# Patient Record
Sex: FEMALE | Race: WHITE | Employment: FULL TIME | URBAN - METROPOLITAN AREA
[De-identification: names, ages, dates, MRNs, and addresses within clinical notes are randomized per-mention and may not be internally consistent; named-entity substitution may affect disease eponyms.]

---

## 2019-11-28 ENCOUNTER — HOSPITAL ENCOUNTER (EMERGENCY)
Age: 49
Discharge: HOME | End: 2019-11-28
Payer: COMMERCIAL

## 2019-11-28 VITALS
DIASTOLIC BLOOD PRESSURE: 77 MMHG | RESPIRATION RATE: 14 BRPM | OXYGEN SATURATION: 97 % | BODY MASS INDEX: 38.62 KG/M2 | HEIGHT: 62 IN | SYSTOLIC BLOOD PRESSURE: 161 MMHG | HEART RATE: 77 BPM | WEIGHT: 209.88 LBS | TEMPERATURE: 98 F

## 2019-11-28 DIAGNOSIS — S61.219A LACERATION OF FINGER OF RIGHT HAND, INITIAL ENCOUNTER: Primary | ICD-10-CM

## 2019-11-28 PROCEDURE — 99282 EMERGENCY DEPT VISIT SF MDM: CPT

## 2019-11-28 PROCEDURE — 90715 TDAP VACCINE 7 YRS/> IM: CPT | Performed by: PHYSICIAN ASSISTANT

## 2019-11-28 PROCEDURE — 4500000022 HC ED LEVEL 2 PROCEDURE

## 2019-11-28 PROCEDURE — 90471 IMMUNIZATION ADMIN: CPT | Performed by: PHYSICIAN ASSISTANT

## 2019-11-28 PROCEDURE — 6360000002 HC RX W HCPCS: Performed by: PHYSICIAN ASSISTANT

## 2019-11-28 PROCEDURE — 2500000003 HC RX 250 WO HCPCS: Performed by: PHYSICIAN ASSISTANT

## 2019-11-28 RX ORDER — LIDOCAINE HYDROCHLORIDE AND EPINEPHRINE 10; 10 MG/ML; UG/ML
20 INJECTION, SOLUTION INFILTRATION; PERINEURAL ONCE
Status: COMPLETED | OUTPATIENT
Start: 2019-11-28 | End: 2019-11-28

## 2019-11-28 RX ADMIN — TETANUS TOXOID, REDUCED DIPHTHERIA TOXOID AND ACELLULAR PERTUSSIS VACCINE, ADSORBED 0.5 ML: 5; 2.5; 8; 8; 2.5 SUSPENSION INTRAMUSCULAR at 21:46

## 2019-11-28 RX ADMIN — LIDOCAINE HYDROCHLORIDE,EPINEPHRINE BITARTRATE 20 ML: 10; .01 INJECTION, SOLUTION INFILTRATION; PERINEURAL at 21:46

## 2019-11-28 SDOH — HEALTH STABILITY: MENTAL HEALTH: HOW OFTEN DO YOU HAVE A DRINK CONTAINING ALCOHOL?: NEVER

## 2019-11-28 ASSESSMENT — PAIN DESCRIPTION - ORIENTATION: ORIENTATION: RIGHT

## 2019-11-28 ASSESSMENT — PAIN SCALES - GENERAL
PAINLEVEL_OUTOF10: 3
PAINLEVEL_OUTOF10: 3

## 2019-11-28 ASSESSMENT — PAIN DESCRIPTION - LOCATION: LOCATION: FINGER (COMMENT WHICH ONE)

## 2019-11-28 ASSESSMENT — PAIN DESCRIPTION - PAIN TYPE: TYPE: ACUTE PAIN

## 2019-11-28 ASSESSMENT — PAIN DESCRIPTION - DESCRIPTORS: DESCRIPTORS: ACHING

## 2019-11-28 ASSESSMENT — PAIN DESCRIPTION - FREQUENCY: FREQUENCY: CONTINUOUS

## 2022-08-23 ENCOUNTER — OFFICE VISIT (OUTPATIENT)
Dept: ENT CLINIC | Age: 52
End: 2022-08-23
Payer: COMMERCIAL

## 2022-08-23 VITALS
HEART RATE: 79 BPM | OXYGEN SATURATION: 97 % | DIASTOLIC BLOOD PRESSURE: 82 MMHG | SYSTOLIC BLOOD PRESSURE: 134 MMHG | TEMPERATURE: 98.6 F

## 2022-08-23 DIAGNOSIS — J33.9 NASAL POLYPOSIS: Primary | Chronic | ICD-10-CM

## 2022-08-23 DIAGNOSIS — J30.89 NON-SEASONAL ALLERGIC RHINITIS, UNSPECIFIED TRIGGER: Chronic | ICD-10-CM

## 2022-08-23 DIAGNOSIS — J32.9 CHRONIC SINUSITIS, UNSPECIFIED LOCATION: Chronic | ICD-10-CM

## 2022-08-23 PROCEDURE — 99204 OFFICE O/P NEW MOD 45 MIN: CPT | Performed by: OTOLARYNGOLOGY

## 2022-08-23 RX ORDER — ACETAMINOPHEN 160 MG
TABLET,DISINTEGRATING ORAL
COMMUNITY
Start: 2022-08-17

## 2022-08-23 RX ORDER — AMLODIPINE BESYLATE 5 MG/1
5 TABLET ORAL DAILY
COMMUNITY

## 2022-08-23 RX ORDER — FLUOXETINE HYDROCHLORIDE 20 MG/1
CAPSULE ORAL
COMMUNITY
Start: 2022-07-21

## 2022-08-23 RX ORDER — FLUTICASONE PROPIONATE 50 MCG
SPRAY, SUSPENSION (ML) NASAL
Qty: 16 G | Refills: 2 | Status: SHIPPED | OUTPATIENT
Start: 2022-08-23

## 2022-08-23 RX ORDER — ACETAMINOPHEN AND CODEINE PHOSPHATE 120; 12 MG/5ML; MG/5ML
1 SOLUTION ORAL DAILY
COMMUNITY

## 2022-08-23 ASSESSMENT — ENCOUNTER SYMPTOMS
RHINORRHEA: 0
SORE THROAT: 0
SINUS PAIN: 0

## 2022-08-23 NOTE — PROGRESS NOTES
[facility]       NEW PATIENT VISIT      PCP:  Nikky Torres MD      REFERRED BY:   Nikky Torres MD       CHIEF COMPLAINT  Chief Complaint   Patient presents with    Nasal Polyps       HISTORY OF PRESENT ILLNESS       Miriam Phillip is a 45 y.o. female who presented today for evaluation and management for Nose problem or 10-15 years. Nasal polyps removed by ENT 30 years ago. No further problems until 12 years ago. Difficulty breathing through nose right side. Put up with it until recently. Not sleeping well. Allergy issues. Sinus infections two episodes in past three years Dr. Nitish Ayala. Uses Xyzal.        REVIEW OF SYSTEMS   Review of Systems   Constitutional:  Negative for chills and fever. HENT:  Negative for ear discharge, ear pain, rhinorrhea, sinus pain and sore throat. PAST MEDICAL HISTORY    Past Medical History:   Diagnosis Date    Diabetes mellitus ([facility]        History reviewed. No pertinent surgical history. EXAMINATION    Vitals:    08/23/22 1626   BP: 134/82   Site: Right Upper Arm   Position: Sitting   Cuff Size: Large Adult   Pulse: 79   Temp: 98.6 °F (37 °C)   TempSrc: Temporal   SpO2: 97%     General:  WDWN, NAD, alert and oriented  Face: There was no swelling or lesions detected. Voice: Normal with no hoarseness or hot potato voice. Ears: The external ears, mastoids, TMs and EACs appeared to be normal.    (+) Nose:  polyps filling right nasal cavity and NSD to the left . Otherwise, the external nose, nasal septum, turbinates, secretions, and mucosa appeared to be normal.   Sinuses:  Maxillary and frontal sinuses were nontender to palpation and percussion.     Oral cavity:  Mucosa, secretions, tongue, and gingiva appeared to be normal.   Oropharynx:  The palatine tonsils, hard and soft palates, uvula, tongue, posterior oropharyngeal wall, mucosa and secretions appeared to be normal. Salivary Glands:  Normal bilateral parotid and bilateral submandibular salivary glands. Neck:  No masses or tenderness. Trachea midline. Laryngeal cartilages and hyoid bone normal.  Normal laryngeal crepitus. Thyroid:  Normal, nontender, no goiter or nodules palpable. Lymph nodes:  No cervical lymphadenopathy. IMPRESSION / Sung Joel / Wiliam Belen was seen today for nasal polyps. Diagnoses and all orders for this visit:    Nasal polyposis  -     CT SINUS FOR IMAGE GUIDANCE  -     fluticasone (FLONASE) 50 MCG/ACT nasal spray; 2 sprays in each nostril daily. Chronic sinusitis, unspecified location  -     CT SINUS FOR IMAGE GUIDANCE  -     fluticasone (FLONASE) 50 MCG/ACT nasal spray; 2 sprays in each nostril daily. Non-seasonal allergic rhinitis, unspecified trigger         RECOMMENDATIONS/PLAN      Sinus CT. Nasal polypectomy, possible FESS. Return in about 3 weeks (around 9/13/2022) for recheck/follow-up, and sooner if condition worsens.

## 2022-09-01 ENCOUNTER — HOSPITAL ENCOUNTER (OUTPATIENT)
Dept: CT IMAGING | Age: 52
Discharge: HOME | End: 2022-09-01
Payer: COMMERCIAL

## 2022-09-01 DIAGNOSIS — J32.9 CHRONIC SINUSITIS, UNSPECIFIED LOCATION: ICD-10-CM

## 2022-09-01 DIAGNOSIS — J33.9 NASAL POLYPOSIS: ICD-10-CM

## 2022-09-01 PROCEDURE — 70486 CT MAXILLOFACIAL W/O DYE: CPT

## 2022-09-17 DIAGNOSIS — J33.9 NASAL POLYPOSIS: Chronic | ICD-10-CM

## 2022-09-17 DIAGNOSIS — J32.9 CHRONIC SINUSITIS, UNSPECIFIED LOCATION: Chronic | ICD-10-CM

## 2022-09-20 ENCOUNTER — OFFICE VISIT (OUTPATIENT)
Dept: ENT CLINIC | Age: 52
End: 2022-09-20
Payer: COMMERCIAL

## 2022-09-20 VITALS — SYSTOLIC BLOOD PRESSURE: 133 MMHG | DIASTOLIC BLOOD PRESSURE: 83 MMHG | HEART RATE: 76 BPM | TEMPERATURE: 97.7 F

## 2022-09-20 DIAGNOSIS — J33.9 NASAL POLYPOSIS: Primary | Chronic | ICD-10-CM

## 2022-09-20 DIAGNOSIS — J32.2 CHRONIC ETHMOIDAL SINUSITIS: ICD-10-CM

## 2022-09-20 PROCEDURE — 99214 OFFICE O/P EST MOD 30 MIN: CPT | Performed by: OTOLARYNGOLOGY

## 2022-09-20 NOTE — PROGRESS NOTES
[facility]       PCP:  Lona Nichols MD      CHIEF COMPLAINT  Chief Complaint   Patient presents with    Nasal Polyps    Sinusitis       HISTORY OF PRESENT ILLNESS      Ted Villeda is a 45 y.o. female here for recheck and follow up of nasal polyps and chronic sinusitis. Patient stated that she has difficulty breathing through her nose more on the right side and this is worse when lying down to try to go to sleep. EXAMINATION    Vitals:    09/20/22 1655   BP: 133/83   Site: Right Upper Arm   Position: Sitting   Cuff Size: Large Adult   Pulse: 76   Temp: 97.7 °F (36.5 °C)   TempSrc: Temporal     General:  WDWN, NAD, alert and oriented  Face: There was no swelling or lesions detected. Voice: Normal with no hoarseness or hot potato voice. Nose:  Right nasal polyps obstructing the right nasal airway. There was moderate deviation of the nasal septum to the left. The external nose, turbinates, secretions, and mucosa appeared to be normal.   Sinuses:  Maxillary and frontal sinuses were nontender to palpation and percussion. REVIEW OF IMAGES          I independently interpreted the images of the CT scan of the sinuses, showing obstructive nasal polyps in the right nasal cavity and scattered mucosal thickening and opacification of right left ethmoid air cells and frontal air cells. I did not see the polypoid mucosal thickening or polyps in the left nasal cavity. Otherwise I agree with the radiologist interpretation. Narrative   EXAMINATION:   CT OF THE SINUS WITHOUT CONTRAST  9/1/2022 7:43 pm       TECHNIQUE:   CT of the sinuses was performed without the administration of intravenous   contrast. Multiplanar reformatted images are provided for review.  Automated   exposure control, iterative reconstruction, and/or weight based adjustment of   the mA/kV was utilized to reduce the radiation dose to as low as reasonably achievable. COMPARISON:   None       HISTORY:   ORDERING SYSTEM PROVIDED HISTORY: Nasal polyposis   TECHNOLOGIST PROVIDED HISTORY:   Is the patient pregnant?->No   Reason for Exam: Dx: Nasal polyposis J33.9 (ICD-10-CM); Chronic sinusitis,   unspecified location J32.9 (ICD-10-CM). evaluate sinuses and nose in light of   large obstructive polyps in the right nasal cavity. FINDINGS:   SINUSES/MASTOIDS: Marked polypoid mucosal thickening is seen within the   bilateral nasal cavity with adjacent scattered partial opacification of the   bilateral ethmoid air cells visualized. The maxillary, sphenoid, ethmoid and   frontal sinuses are otherwise clear. The bilateral ostiomeatal units are   patent. Ethmoid roofs are symmetric. The mastoid air cells are well aerated. SOFT TISSUES:  Visualized soft tissues demonstrate no acute abnormality. The   visualized portion of the intracranial contents demonstrate no gross acute   abnormality. Impression   Marked bilateral nasal cavity polypoid mucosal thickening. Adjacent scattered partial opacification of bilateral ethmoid air cells. COUNSELING FOR ENDOSCOPIC SINUS SURGERY  I counseled Beth for the procedure of ENDOSCOPIC SINUS SURGERY, POSSIBLE POLYPECTOMY, SEPTAL RECONSTRUCTION (SEPTOPLASTY), OR PARTIAL EXCISION, OUTFRACTURE, OR CAUTERIZATION OF NASAL TURBINATES, POSSIBLE ANTERIOR CANINE FOSSA ANTROSTOMY THROUGH AN INCISION UNDER THE UPPER LIP (BUI-EDITH APPROACH). I advised that the purpose/goal of this surgery is for the treatment of and the attempt to improve chronic and/or recurrent sinusitis, nasal polyps and/or nasal obstruction, with difficulty breathing through the nose. I advised that the expected outcome and potential benefits of surgery, include, but are not limited to:  Decreased frequency of sinus infections, resolution of chronic sinusitis, improvement in nasal breathing.   Diagnosis of polyp or cyst.  I advised that certain expected conditions may occur after this surgery, usually temporary, including, but not limited to, bleeding, nasal drainage and/or crusting, pain and discomfort, numbness or pain of the upper lip, teeth, and/or gums, decrease in sense of taste or smell, and headache. I advised Beth of the attendant RISKS AND POTENTIAL COMPLICATIONS, including, but not limited to:  excessive bleeding, infection, persistent or recurrent sinusitis, persistent nasal/post-nasal drainage, excessive crusting in nose, persistent nasal obstruction and/or difficulty breathing and/or snoring, persistence of headache, recurrence of polyps or enlargement of turbinates, loss of part or all of turbinate(s) due to necrosis or slough, chronic pain or neuralgia, numbness of the facial skin in the cheek and lateral nasal areas or the upper lip and gums (infra-orbital nerve injury), atrophic rhinitis (dry nose), decreased (partial or total) sense of smell or taste, need for further surgery or secondary procedure, risks of septoplasty (hole or perforation of the septum, residual deformity or deviation of the septum, depression of nose, saddle-nose deformity), adverse reactions to medications, and the risks and complications of anesthesia. I advised of certain rare complications, including, but not limited to:  central retinal artery occlusion, disturbance or loss of vision, cerebrospinal fluid leak, meningitis, brain abscess/infection, pulmonary embolism, and complications of anesthesia, including cardiac arrest and death. I discussed the alternatives of therapy, including, but not limited to: Further observation and medical treatment.   Potential risk, possible consequences, and adverse effects of not undergoing the surgery, including, but not limited to: persistence of sinusitis and the associated symptoms or problems, complications of untreated sinusitis, including, but not limited to, meningitis, brain abscess, loss of vision, or complete, blindness, and death. The patient was counseled about the risks of lila Covid-19 during the perioperative period and any recovery window from the procedure. The patient was made aware that lila Covid-19 may worsen the prognosis for recovering from the procedure and lend to a higher morbidity and/or mortality risk. All material risks, benefits, and reasonable alternatives including postponing the procedure were discussed. The patient does wish to proceed with the procedure at this time. THONY / Jose Hull / Grady Sams was seen today for nasal polyps and sinusitis. Diagnoses and all orders for this visit:    Nasal polyposis    Chronic frontal sinusitis    Chronic ethmoidal sinusitis    Chronic right maxillary sinusitis       RECOMMENDATIONS/PLAN      Nasal polypectomy, FESS-ethmoidectomy, right side, possible left. Return for post op check, follow-up, to be arranged.

## 2022-09-21 RX ORDER — FLUTICASONE PROPIONATE 50 MCG
SPRAY, SUSPENSION (ML) NASAL
Refills: 2 | OUTPATIENT
Start: 2022-09-21

## 2022-11-17 ENCOUNTER — ANESTHESIA EVENT (OUTPATIENT)
Dept: OPERATING ROOM | Age: 52
End: 2022-11-17
Payer: COMMERCIAL

## 2022-11-17 NOTE — PROGRESS NOTES
Patient not reached. Preop instructions left on voice mail. [facility]___[phone]____________    -Date__11/18/2022_____time___0730____arrival____0600   [facility]________  -Nothing to eat or drink after midnight  -Responsible adult 18 or older to stay on site while you are here and drive you home and stay with you after  -Follow any instructions your doctors office has given you  -Bring a complete list of all your medications and supplements  -If you normally take the following medications in the morning please do so with a small    sip of water-heart,blood pressure,seizure,breathing or thyroid-avoid water pilll Do not take blood pressure medications ending in \"marcelino\" or \"pril\" the AM of surgery or the nuno prior  -You may use your inhalers  -Take half of your normal dose of any long acting insulins the night before-do not take    any diabetic medications in the morning  -Follow your doctors instructions regarding blood thinners  -Any questions call your surgeons office            VISITOR POLICY(subject to change)    Current policy is 2 visitors per patient. No children. Masks at discretion of facility. Visiting hours are 8a-8p. Overnight visitors will be at the discretion of the nurse. All policies are subject to change.

## 2022-11-18 ENCOUNTER — HOSPITAL ENCOUNTER (OUTPATIENT)
Age: 52
Setting detail: OUTPATIENT SURGERY
Discharge: HOME | End: 2022-11-18
Attending: OTOLARYNGOLOGY | Admitting: OTOLARYNGOLOGY
Payer: COMMERCIAL

## 2022-11-18 ENCOUNTER — TELEPHONE (OUTPATIENT)
Dept: ENT CLINIC | Age: 52
End: 2022-11-18

## 2022-11-18 ENCOUNTER — ANESTHESIA (OUTPATIENT)
Dept: OPERATING ROOM | Age: 52
End: 2022-11-18
Payer: COMMERCIAL

## 2022-11-18 VITALS
TEMPERATURE: 97.8 F | HEART RATE: 68 BPM | RESPIRATION RATE: 12 BRPM | OXYGEN SATURATION: 93 % | WEIGHT: 208 LBS | BODY MASS INDEX: 38.28 KG/M2 | SYSTOLIC BLOOD PRESSURE: 109 MMHG | DIASTOLIC BLOOD PRESSURE: 53 MMHG | HEIGHT: 62 IN

## 2022-11-18 DIAGNOSIS — G89.18 POSTOPERATIVE PAIN: Primary | ICD-10-CM

## 2022-11-18 DIAGNOSIS — J33.9 NASAL POLYPS: ICD-10-CM

## 2022-11-18 DIAGNOSIS — J32.2 CHRONIC ETHMOIDAL SINUSITIS: ICD-10-CM

## 2022-11-18 LAB
GLUCOSE BLD-MCNC: 177 MG/DL (ref 70–99)
GLUCOSE BLD-MCNC: 240 MG/DL (ref 70–99)
HCG(URINE) PREGNANCY TEST: NEGATIVE
PERFORMED ON: ABNORMAL
PERFORMED ON: ABNORMAL

## 2022-11-18 PROCEDURE — 2709999900 HC NON-CHARGEABLE SUPPLY: Performed by: OTOLARYNGOLOGY

## 2022-11-18 PROCEDURE — 3700000001 HC ADD 15 MINUTES (ANESTHESIA): Performed by: OTOLARYNGOLOGY

## 2022-11-18 PROCEDURE — 2500000003 HC RX 250 WO HCPCS: Performed by: NURSE ANESTHETIST, CERTIFIED REGISTERED

## 2022-11-18 PROCEDURE — 31255 NSL/SINS NDSC W/TOT ETHMDCT: CPT | Performed by: OTOLARYNGOLOGY

## 2022-11-18 PROCEDURE — 2580000003 HC RX 258: Performed by: OTOLARYNGOLOGY

## 2022-11-18 PROCEDURE — 7100000001 HC PACU RECOVERY - ADDTL 15 MIN: Performed by: OTOLARYNGOLOGY

## 2022-11-18 PROCEDURE — 84703 CHORIONIC GONADOTROPIN ASSAY: CPT

## 2022-11-18 PROCEDURE — 2500000003 HC RX 250 WO HCPCS: Performed by: OTOLARYNGOLOGY

## 2022-11-18 PROCEDURE — 6360000002 HC RX W HCPCS: Performed by: OTOLARYNGOLOGY

## 2022-11-18 PROCEDURE — 3700000000 HC ANESTHESIA ATTENDED CARE: Performed by: OTOLARYNGOLOGY

## 2022-11-18 PROCEDURE — 7100000011 HC PHASE II RECOVERY - ADDTL 15 MIN: Performed by: OTOLARYNGOLOGY

## 2022-11-18 PROCEDURE — 6370000000 HC RX 637 (ALT 250 FOR IP): Performed by: NURSE ANESTHETIST, CERTIFIED REGISTERED

## 2022-11-18 PROCEDURE — 6370000000 HC RX 637 (ALT 250 FOR IP): Performed by: OTOLARYNGOLOGY

## 2022-11-18 PROCEDURE — 7100000000 HC PACU RECOVERY - FIRST 15 MIN: Performed by: OTOLARYNGOLOGY

## 2022-11-18 PROCEDURE — 3600000014 HC SURGERY LEVEL 4 ADDTL 15MIN: Performed by: OTOLARYNGOLOGY

## 2022-11-18 PROCEDURE — 3600000004 HC SURGERY LEVEL 4 BASE: Performed by: OTOLARYNGOLOGY

## 2022-11-18 PROCEDURE — 2580000003 HC RX 258: Performed by: NURSE ANESTHETIST, CERTIFIED REGISTERED

## 2022-11-18 PROCEDURE — 6360000002 HC RX W HCPCS: Performed by: NURSE ANESTHETIST, CERTIFIED REGISTERED

## 2022-11-18 PROCEDURE — 7100000010 HC PHASE II RECOVERY - FIRST 15 MIN: Performed by: OTOLARYNGOLOGY

## 2022-11-18 PROCEDURE — A4217 STERILE WATER/SALINE, 500 ML: HCPCS | Performed by: OTOLARYNGOLOGY

## 2022-11-18 PROCEDURE — 6370000000 HC RX 637 (ALT 250 FOR IP): Performed by: ANESTHESIOLOGY

## 2022-11-18 PROCEDURE — 2720000010 HC SURG SUPPLY STERILE: Performed by: OTOLARYNGOLOGY

## 2022-11-18 PROCEDURE — 88304 TISSUE EXAM BY PATHOLOGIST: CPT

## 2022-11-18 RX ORDER — SODIUM CHLORIDE 9 MG/ML
INJECTION, SOLUTION INTRAVENOUS CONTINUOUS
Status: DISCONTINUED | OUTPATIENT
Start: 2022-11-18 | End: 2022-11-18 | Stop reason: HOSPADM

## 2022-11-18 RX ORDER — HYDRALAZINE HYDROCHLORIDE 20 MG/ML
10 INJECTION INTRAMUSCULAR; INTRAVENOUS
Status: DISCONTINUED | OUTPATIENT
Start: 2022-11-18 | End: 2022-11-18 | Stop reason: HOSPADM

## 2022-11-18 RX ORDER — PROCHLORPERAZINE EDISYLATE 5 MG/ML
5 INJECTION INTRAMUSCULAR; INTRAVENOUS
Status: DISCONTINUED | OUTPATIENT
Start: 2022-11-18 | End: 2022-11-18 | Stop reason: HOSPADM

## 2022-11-18 RX ORDER — MAGNESIUM SULFATE HEPTAHYDRATE 500 MG/ML
INJECTION, SOLUTION INTRAMUSCULAR; INTRAVENOUS PRN
Status: DISCONTINUED | OUTPATIENT
Start: 2022-11-18 | End: 2022-11-18 | Stop reason: SDUPTHER

## 2022-11-18 RX ORDER — HYDROCODONE BITARTRATE AND ACETAMINOPHEN 7.5; 325 MG/1; MG/1
1 TABLET ORAL EVERY 6 HOURS PRN
Qty: 24 TABLET | Refills: 0 | Status: SHIPPED | OUTPATIENT
Start: 2022-11-18 | End: 2022-11-24

## 2022-11-18 RX ORDER — LIDOCAINE HYDROCHLORIDE 20 MG/ML
INJECTION, SOLUTION EPIDURAL; INFILTRATION; INTRACAUDAL; PERINEURAL PRN
Status: DISCONTINUED | OUTPATIENT
Start: 2022-11-18 | End: 2022-11-18 | Stop reason: SDUPTHER

## 2022-11-18 RX ORDER — LIDOCAINE HYDROCHLORIDE 10 MG/ML
1 INJECTION, SOLUTION EPIDURAL; INFILTRATION; INTRACAUDAL; PERINEURAL
Status: CANCELLED | OUTPATIENT
Start: 2022-11-18 | End: 2022-11-19

## 2022-11-18 RX ORDER — LABETALOL HYDROCHLORIDE 5 MG/ML
10 INJECTION, SOLUTION INTRAVENOUS
Status: DISCONTINUED | OUTPATIENT
Start: 2022-11-18 | End: 2022-11-18 | Stop reason: HOSPADM

## 2022-11-18 RX ORDER — OXYMETAZOLINE HYDROCHLORIDE 0.05 G/100ML
2 SPRAY NASAL
Status: COMPLETED | OUTPATIENT
Start: 2022-11-18 | End: 2022-11-18

## 2022-11-18 RX ORDER — PROMETHAZINE HYDROCHLORIDE 25 MG/1
25 TABLET ORAL EVERY 6 HOURS PRN
Qty: 40 TABLET | Refills: 0 | Status: SHIPPED | OUTPATIENT
Start: 2022-11-18

## 2022-11-18 RX ORDER — SODIUM CHLORIDE 9 MG/ML
INJECTION, SOLUTION INTRAVENOUS CONTINUOUS PRN
Status: DISCONTINUED | OUTPATIENT
Start: 2022-11-18 | End: 2022-11-18 | Stop reason: SDUPTHER

## 2022-11-18 RX ORDER — FENTANYL CITRATE 50 UG/ML
INJECTION, SOLUTION INTRAMUSCULAR; INTRAVENOUS PRN
Status: DISCONTINUED | OUTPATIENT
Start: 2022-11-18 | End: 2022-11-18 | Stop reason: SDUPTHER

## 2022-11-18 RX ORDER — OXYMETAZOLINE HYDROCHLORIDE 0.05 G/100ML
SPRAY NASAL
Status: COMPLETED | OUTPATIENT
Start: 2022-11-18 | End: 2022-11-18

## 2022-11-18 RX ORDER — ONDANSETRON 2 MG/ML
4 INJECTION INTRAMUSCULAR; INTRAVENOUS
Status: DISCONTINUED | OUTPATIENT
Start: 2022-11-18 | End: 2022-11-18 | Stop reason: HOSPADM

## 2022-11-18 RX ORDER — ALBUTEROL SULFATE 90 UG/1
AEROSOL, METERED RESPIRATORY (INHALATION) PRN
Status: DISCONTINUED | OUTPATIENT
Start: 2022-11-18 | End: 2022-11-18 | Stop reason: SDUPTHER

## 2022-11-18 RX ORDER — MAGNESIUM HYDROXIDE 1200 MG/15ML
LIQUID ORAL CONTINUOUS PRN
Status: COMPLETED | OUTPATIENT
Start: 2022-11-18 | End: 2022-11-18

## 2022-11-18 RX ORDER — MIDAZOLAM HYDROCHLORIDE 1 MG/ML
INJECTION INTRAMUSCULAR; INTRAVENOUS PRN
Status: DISCONTINUED | OUTPATIENT
Start: 2022-11-18 | End: 2022-11-18 | Stop reason: SDUPTHER

## 2022-11-18 RX ORDER — DEXAMETHASONE SODIUM PHOSPHATE 4 MG/ML
INJECTION, SOLUTION INTRA-ARTICULAR; INTRALESIONAL; INTRAMUSCULAR; INTRAVENOUS; SOFT TISSUE PRN
Status: DISCONTINUED | OUTPATIENT
Start: 2022-11-18 | End: 2022-11-18 | Stop reason: SDUPTHER

## 2022-11-18 RX ORDER — HYDROMORPHONE HCL 110MG/55ML
0.5 PATIENT CONTROLLED ANALGESIA SYRINGE INTRAVENOUS EVERY 5 MIN PRN
Status: DISCONTINUED | OUTPATIENT
Start: 2022-11-18 | End: 2022-11-18 | Stop reason: HOSPADM

## 2022-11-18 RX ORDER — KETAMINE HCL IN NACL, ISO-OSM 100MG/10ML
SYRINGE (ML) INJECTION PRN
Status: DISCONTINUED | OUTPATIENT
Start: 2022-11-18 | End: 2022-11-18 | Stop reason: SDUPTHER

## 2022-11-18 RX ORDER — FENTANYL CITRATE 50 UG/ML
25 INJECTION, SOLUTION INTRAMUSCULAR; INTRAVENOUS EVERY 5 MIN PRN
Status: DISCONTINUED | OUTPATIENT
Start: 2022-11-18 | End: 2022-11-18 | Stop reason: HOSPADM

## 2022-11-18 RX ORDER — CEFUROXIME AXETIL 250 MG/1
250 TABLET ORAL 2 TIMES DAILY
Qty: 14 TABLET | Refills: 0 | Status: SHIPPED | OUTPATIENT
Start: 2022-11-18 | End: 2022-11-25

## 2022-11-18 RX ORDER — GLYCOPYRROLATE 0.2 MG/ML
INJECTION INTRAMUSCULAR; INTRAVENOUS PRN
Status: DISCONTINUED | OUTPATIENT
Start: 2022-11-18 | End: 2022-11-18 | Stop reason: SDUPTHER

## 2022-11-18 RX ORDER — OXYCODONE HYDROCHLORIDE 5 MG/1
5 TABLET ORAL
Status: COMPLETED | OUTPATIENT
Start: 2022-11-18 | End: 2022-11-18

## 2022-11-18 RX ORDER — ROCURONIUM BROMIDE 10 MG/ML
INJECTION, SOLUTION INTRAVENOUS PRN
Status: DISCONTINUED | OUTPATIENT
Start: 2022-11-18 | End: 2022-11-18 | Stop reason: SDUPTHER

## 2022-11-18 RX ORDER — ONDANSETRON 2 MG/ML
INJECTION INTRAMUSCULAR; INTRAVENOUS PRN
Status: DISCONTINUED | OUTPATIENT
Start: 2022-11-18 | End: 2022-11-18 | Stop reason: SDUPTHER

## 2022-11-18 RX ORDER — SUCCINYLCHOLINE/SOD CL,ISO/PF 200MG/10ML
SYRINGE (ML) INTRAVENOUS PRN
Status: DISCONTINUED | OUTPATIENT
Start: 2022-11-18 | End: 2022-11-18 | Stop reason: SDUPTHER

## 2022-11-18 RX ORDER — PROPOFOL 10 MG/ML
INJECTION, EMULSION INTRAVENOUS PRN
Status: DISCONTINUED | OUTPATIENT
Start: 2022-11-18 | End: 2022-11-18 | Stop reason: SDUPTHER

## 2022-11-18 RX ADMIN — SODIUM CHLORIDE: 9 INJECTION, SOLUTION INTRAVENOUS at 09:04

## 2022-11-18 RX ADMIN — Medication 30 MG: at 09:01

## 2022-11-18 RX ADMIN — SODIUM CHLORIDE: 9 INJECTION, SOLUTION INTRAVENOUS at 07:36

## 2022-11-18 RX ADMIN — GLYCOPYRROLATE 0.2 MG: 0.2 INJECTION INTRAMUSCULAR; INTRAVENOUS at 07:56

## 2022-11-18 RX ADMIN — MAGNESIUM SULFATE HEPTAHYDRATE 1 G: 500 INJECTION, SOLUTION INTRAMUSCULAR; INTRAVENOUS at 08:00

## 2022-11-18 RX ADMIN — ONDANSETRON 4 MG: 2 INJECTION INTRAMUSCULAR; INTRAVENOUS at 08:04

## 2022-11-18 RX ADMIN — Medication 2 SPRAY: at 06:25

## 2022-11-18 RX ADMIN — ALBUTEROL SULFATE 2 PUFF: 90 AEROSOL, METERED RESPIRATORY (INHALATION) at 07:51

## 2022-11-18 RX ADMIN — ALBUTEROL SULFATE 2 PUFF: 90 AEROSOL, METERED RESPIRATORY (INHALATION) at 07:49

## 2022-11-18 RX ADMIN — DEXAMETHASONE SODIUM PHOSPHATE 4 MG: 4 INJECTION, SOLUTION INTRAMUSCULAR; INTRAVENOUS at 08:04

## 2022-11-18 RX ADMIN — Medication 20 MG: at 09:15

## 2022-11-18 RX ADMIN — FENTANYL CITRATE 50 MCG: 50 INJECTION, SOLUTION INTRAMUSCULAR; INTRAVENOUS at 09:15

## 2022-11-18 RX ADMIN — SODIUM CHLORIDE: 9 INJECTION, SOLUTION INTRAVENOUS at 06:39

## 2022-11-18 RX ADMIN — OXYCODONE 5 MG: 5 TABLET ORAL at 10:26

## 2022-11-18 RX ADMIN — ROCURONIUM BROMIDE 40 MG: 10 INJECTION, SOLUTION INTRAVENOUS at 07:48

## 2022-11-18 RX ADMIN — ROCURONIUM BROMIDE 10 MG: 10 INJECTION, SOLUTION INTRAVENOUS at 07:42

## 2022-11-18 RX ADMIN — LIDOCAINE HYDROCHLORIDE 100 MG: 20 INJECTION, SOLUTION EPIDURAL; INFILTRATION; INTRACAUDAL; PERINEURAL at 07:42

## 2022-11-18 RX ADMIN — CEFAZOLIN 2000 MG: 2 INJECTION, POWDER, FOR SOLUTION INTRAMUSCULAR; INTRAVENOUS at 07:35

## 2022-11-18 RX ADMIN — FENTANYL CITRATE 50 MCG: 50 INJECTION, SOLUTION INTRAMUSCULAR; INTRAVENOUS at 07:42

## 2022-11-18 RX ADMIN — PROPOFOL 150 MG: 10 INJECTION, EMULSION INTRAVENOUS at 07:42

## 2022-11-18 RX ADMIN — INSULIN HUMAN 5 UNITS: 100 INJECTION, SOLUTION PARENTERAL at 10:03

## 2022-11-18 RX ADMIN — Medication 160 MG: at 07:42

## 2022-11-18 RX ADMIN — MIDAZOLAM 2 MG: 1 INJECTION INTRAMUSCULAR; INTRAVENOUS at 07:34

## 2022-11-18 RX ADMIN — SUGAMMADEX 200 MG: 100 INJECTION, SOLUTION INTRAVENOUS at 09:20

## 2022-11-18 ASSESSMENT — PAIN DESCRIPTION - LOCATION
LOCATION: NOSE

## 2022-11-18 ASSESSMENT — PAIN SCALES - WONG BAKER
WONGBAKER_NUMERICALRESPONSE: 0

## 2022-11-18 ASSESSMENT — PAIN SCALES - GENERAL
PAINLEVEL_OUTOF10: 4

## 2022-11-18 ASSESSMENT — ENCOUNTER SYMPTOMS: SHORTNESS OF BREATH: 0

## 2022-11-18 ASSESSMENT — PAIN DESCRIPTION - DESCRIPTORS
DESCRIPTORS: DISCOMFORT

## 2022-11-18 ASSESSMENT — PAIN - FUNCTIONAL ASSESSMENT: PAIN_FUNCTIONAL_ASSESSMENT: 0-10

## 2022-11-18 NOTE — PROGRESS NOTES
Pt arrived from OR to PACU bay 1. Reported received from [facility] [address] RN/CRNA staff. Pt  arousable to pain. Surgical incisions dressings in place to nose. Pt on 10 L simple mask, NSR, and VSS. Will continue to monitor.

## 2022-11-18 NOTE — ANESTHESIA PRE PROCEDURE
Department of Anesthesiology  Preprocedure Note       Name:  Kamala Junior   Age:  45 y.o.  :  1970                                          MRN:  [id number]         Date:  2022      Surgeon: José Miguel George):  Lyly Hernandez MD    Procedure: Procedure(s):  NASAL POLYPECTOMY; FUNCTIONAL ENDOSCOPIC SINSUS SURGERY (ETHMOID) WITH [employer] NAVIGATION    Medications prior to admission:   Prior to Admission medications    Medication Sig Start Date End Date Taking? Authorizing Provider   amLODIPine (NORVASC) 5 MG tablet Take 5 mg by mouth daily    Historical Provider, MD   metFORMIN (GLUCOPHAGE) 500 MG tablet Take 500 mg by mouth 2 times daily (with meals) 22   Historical Provider, MD   FLUoxetine (PROZAC) 20 MG capsule TAKE 2 CAPSULES(40MG TOTAL)DAILY 22   Historical Provider, MD   Cholecalciferol (VITAMIN D3) 50 MCG ( UT) CAPS  22   Historical Provider, MD   norethindrone (MICRONOR) 0.35 MG tablet Take 1 tablet by mouth daily    Historical Provider, MD   fluticasone (FLONASE) 50 MCG/ACT nasal spray 2 sprays in each nostril daily. 22   Lyly Hernandez MD       Current medications:    Current Facility-Administered Medications   Medication Dose Route Frequency Provider Last Rate Last Admin    0.9 % sodium chloride infusion   IntraVENous Continuous Lyly Hernandez  mL/hr at 22 0639 New Bag at 22 0639    ceFAZolin (ANCEF) 2,000 mg in dextrose 5 % 50 mL IVPB (mini-bag)  2,000 mg IntraVENous Once Lyly Hernandez MD           Allergies:  No Known Allergies    Problem List:  There is no problem list on file for this patient.       Past Medical History:        Diagnosis Date    Diabetes mellitus ([address]        Past Surgical History:        Procedure Laterality Date    CHOLECYSTECTOMY      FINGER TRIGGER RELEASE Right 2019       Social History:    Social History     Tobacco Use    Smoking status: Never    Smokeless tobacco: Never   Substance Use Topics    Alcohol use: Never                                Counseling given: Not Answered      Vital Signs (Current):   Vitals:    11/18/22 0605 11/18/22 0622   BP:  128/79   Pulse:  70   Resp:  16   Temp: 97.4 °F (36.3 °C)    SpO2:  97%   Weight: 208 lb (94.3 kg)    Height: 5' 2\" (1.575 m)                                               BP Readings from Last 3 Encounters:   11/18/22 128/79   09/20/22 133/83   08/23/22 134/82       NPO Status: Time of last liquid consumption: 0000                        Time of last solid consumption: 0000                        Date of last liquid consumption: 11/18/22                        Date of last solid food consumption: 11/18/22    BMI:   Wt Readings from Last 3 Encounters:   11/18/22 208 lb (94.3 kg)   11/28/19 209 lb 14.1 oz (95.2 kg)     Body mass index is 38.04 kg/m².     CBC:   Lab Results   Component Value Date/Time    WBC 9.7 06/07/2010 10:00 PM    RBC 4.76 06/07/2010 10:00 PM    HGB 14.3 06/07/2010 10:00 PM    HCT 42.4 06/07/2010 10:00 PM    MCV 89.2 06/07/2010 10:00 PM    RDW 13.0 06/07/2010 10:00 PM     06/07/2010 10:00 PM       CMP:   Lab Results   Component Value Date/Time     01/14/2011 12:00 AM    K 4.8 01/14/2011 12:00 AM     01/14/2011 12:00 AM    CO2 27 01/14/2011 12:00 AM    BUN 17 01/14/2011 12:00 AM    CREATININE 0.7 01/14/2011 12:00 AM    GFRAA >60 01/14/2011 12:00 AM    AGRATIO 1.5 01/14/2011 12:00 AM    GLUCOSE 85 01/14/2011 12:00 AM    PROT 7.5 01/14/2011 12:00 AM    CALCIUM 9.7 01/14/2011 12:00 AM    BILITOT 0.30 01/14/2011 12:00 AM    ALKPHOS 63 01/14/2011 12:00 AM    AST 27 01/14/2011 12:00 AM    ALT 23 01/14/2011 12:00 AM       POC Tests:   Recent Labs     11/18/22  0633   POCGLU 177*       Coags: No results found for: PROTIME, INR, APTT    HCG (If Applicable):   Lab Results   Component Value Date    PREGTESTUR Negative 11/18/2022        ABGs: No results found for: PHART, PO2ART, BGJ4TJE, IXP5VSF, BEART, X5YEFGPU     Type & Screen (If Applicable):  No results found for: LABABO, LABRH    Drug/Infectious Status (If Applicable):  No results found for: HIV, HEPCAB    COVID-19 Screening (If Applicable): No results found for: COVID19        Anesthesia Evaluation  Patient summary reviewed and Nursing notes reviewed no history of anesthetic complications:   Airway: Mallampati: II  TM distance: >3 FB   Neck ROM: full  Mouth opening: > = 3 FB   Dental: normal exam         Pulmonary:       (-) asthma and shortness of breath                           Cardiovascular:        (-) hypertension and  angina                Neuro/Psych:      (-) CVA           GI/Hepatic/Renal:        (-) GERD and liver disease       Endo/Other:    (+) DiabetesType II DM, , .    (-) hypothyroidism               Abdominal:             Vascular:     - PVD. Other Findings:           Anesthesia Plan      general     ASA 2       Induction: intravenous. MIPS: Postoperative opioids intended and Prophylactic antiemetics administered. Anesthetic plan and risks discussed with patient. Use of blood products discussed with patient whom. Plan discussed with CRNA.                     Jeremy Rollins MD   11/18/2022

## 2022-11-18 NOTE — BRIEF OP NOTE
Brief Postoperative Note      Patient: Crystal Durham  YOB: 1970  MRN: [id number]    Date of Procedure: 11/18/2022    Pre-Op Diagnosis: Nasal polyps [J33.9]  Chronic ethmoidal sinusitis [J32.2]    Post-Op Diagnosis: Same       Procedure(s):  NASAL POLYPECTOMY; FUNCTIONAL ENDOSCOPIC SINSUS SURGERY (ETHMOID) WITH [employer] NAVIGATION    Surgeon(s):  Angeline Adler MD    Assistant:  * No surgical staff found *    Anesthesia: General    Estimated Blood Loss (mL): less than 100 (one hundred)      Complications: None    Specimens:   ID Type Source Tests Collected by Time Destination   A :  Tissue Tissue SURGICAL PATHOLOGY Angeline Adler MD 11/18/2022 9473    B :  Tissue Tissue SURGICAL PATHOLOGY Angeline Adler MD 11/18/2022 4807    C :  Tissue Tissue SURGICAL PATHOLOGY Angeline Adler MD 11/18/2022 9858        Implants:  * No implants in log *      Drains: * No LDAs found *    Findings: There were bilateral obstructive nasal polyps in the upper nasal cavities, middle meatus and extending into the ethmoid sinus air cells. There was polypoid degeneration of both middle turbinates.   The remainder of the nasal cavities appeared to be normal.      Electronically signed by Angeline Adler MD on 11/18/2022 at 9:43 AM

## 2022-11-18 NOTE — DISCHARGE INSTRUCTIONS
[facility]    Ac Le. Surekha Mars M.D.  [address] [place] [address]  [phone]        ADDITIONAL POST OPERATIVE INSTRUCTIONS  ENDOSCOPIC SINUS SURGERY        POST OPERATIVE PAIN  The severity of post operative pain will vary. Usually there is mild to moderate pain after endoscopic sinus surgery. Pain medication will help, but do not expect to be totally pain free. Use acetaminophen (e.g. Tylenol) for mild to moderate pain. Use the prescription pain medication for moderate to severe pain. Remember that many pain medications, such as Vicodin, Norco, and Percocet, contain acetaminophen. Do not exceed a total of 4000 mg of acetaminophen per 24 hours, from any and all sources, including your prescription pain medication, to avoid possible liver damage. ACTIVITY  When resting, rest in bed, face up, on the day of surgery and the first day after surgery. Elevate your head at all times, for the first three days after surgery, using two or three pillows when lying down. Keep moving your legs whenever you are lying down. Beginning this evening, you should be up and about, walking, at least several times a day, with quiet indoor activities. Advance your activity level as tolerated. Please refrain from any strenuous physical activity, heavy lifting over 15 pounds,  or exercise for two weeks after surgery. Do not drive a motorized vehicle, operate heavy or complicated machinery, or engage in any activities requiring normal judgement and concentration and decision making while you are taking narcotic pain medication. Refrain from any activity that may result in a blow or injury to the nose for six weeks. DRAINING AND BLEEDING are normal after nasal surgery. Change your mustache dressing under the nose as it becomes soiled or soaked. If bleeding occurs, lie with the head elevated on two pillows.   Put crushed ice in a plastic sandwich bag wrapped in cloth, such as a cotton regis-shirt, and place on the forehead. SWELLING of the upper lip and face are common after surgery. This will subside after a few days. SINUS AND/OR NASAL PACKING   Dissolvable packing was inserted into both of your ethmoid sinus cavities. This packing does not need to be removed and will gradually dissolve away. NASAL IRRIGATION  Irrigate both sides of your nose gently every four hours while awake with a bulb syringe, using the following solution: one-fourth teaspoon of table salt in one cup of distilled water. Alternatively, you may use a NeilMed squeeze bottle and NeilMed saline packets, which may be purchased at your pharmacy. DO NOT USE TAP WATER due to possible contaminants and organisms in the tap water! NASAL SALINE SPRAY  Use a saline nasal mist spray in both nostrils every three hours while awake, for 14 days, and then four times daily, to keep the inside of your nose moist.      NO NOSE BLOWING  DO NOT BLOW YOUR NOSE for the first ten days after surgery. You may sniff or irrigate your nose to clear mucus, but DO NOT BLOW YOUR NOSE. POST OP VISIT / DEBRIDEMENT PROCEDURE  Your first post op visit is scheduled for 12/01/22 at 1 PM.  A diagnostic nasal endoscopy will be done that day. After functional endoscopic sinus surgery, additional in-office procedures may be needed, over the first six weeks after surgery, to clean the sinus cavities and promote proper healing. If needed, this will be done at your first post op visit. Any further procedures will be scheduled, if needed and indicated. This procedure is usually done in the office under topical anesthesia, and is similar to the diagnostic nasal endoscopy you have had before. You may drive yourself to and from that appointment unless you have taken a narcotic pain medication within six hours.         Take only those medications prescribed by Doctor Jigna Guerra, and your usual prescribed medications, if approved by Doctor Efrain Klein. QUESTIONS? If you have any questions or have any problems after surgery, please do not hesitate to call the office at [phone]. GENERAL SURGERY DISCHARGE INSTRUCTIONS    Follow your surgeons instructions. Follow up with your surgeon as directed. Observe the operative area for signs of excessive bleeding. If needed apply pressure,elevate if able and contact your surgeon. Observe the operative site for any signs of infection- such as increased pain,redness,fever greater than 101 degrees,swelling, foul odor or drainage. Contact your surgeon if any of these symptoms are present. Keep operative site clean and dry. Do not remove dressing unless instructed to by surgeon. Apply ice as directed. Avoid pulling,pushing or tugging to suture line. If you become short of breath call your doctor or go to the ER. Take medications as directed. Pain medication should be taken with food. Do not drive or operate machinery while taking narcotics. For any problems or question call your surgeon. ANESTHESIA DISCHARGE INSTRUCTIONS    Wear your seatbelt home. You are under the influence of drugs-do not drink alcohol,drive,operate machinery,or make any important decisions or sign any legal documentsfor 24 hours  A responsible adult needs to be with you for 24 hours. You may experience lightheadedness,dizziness,or sleepiness following surgery. Rest at home today- increase activity as tolerated. Progress slowly to a regular diet unless your physician has instructed you otherwise. Drink plenty of water. If nausea becomes a problem call your physician. Coughing,sore throat,and muscle aches are other side effects of anesthesia,and should improve with time. Do not drive,operate machinery while taking narcotics.    Females of childbearing potential and on hormonal birth control, should use two forms of contraception following procedure if given a medication called sugammadex and/or emend. Additional contraception should be used for 7 days for sugammadex and/or 28 days for emend. These medications have a potential to reduce the effectiveness of hormonal birth control.

## 2022-11-18 NOTE — H&P
Date of Surgery Update:  Cezar Lara was seen, history and physical examination reviewed, and patient examined by me today. There have been no significant clinical changes since the completion of the previous history and physical.    The risk, benefits, and alternatives of the proposed procedure have been explained to the patient (or appropriate guardian) and understanding verbalized. All questions answered. Patient wishes to proceed.     Electronically signed by: J Carlos Shearer MD,11/18/2022,7:22 AM

## 2022-11-18 NOTE — TELEPHONE ENCOUNTER
Phone call. Spoke to patient. She reported having a moderate headache on the left side. She is using a cold compress. She is not due for pain medication until 4 PM or so as she is taking this every 6 hours. I did advise her that she can take 2 pain pills at a time every 6 hours or she can increase to 1 pain pill every 4 hours as needed for pain but once pain is better controlled she should cut back to 1 pill every 6 hours. Advised her that the dry mouth may be an effect of the anesthesia occasions and that this probably will dissipate and clear up. I advised her to use the bedside humidifier and stay well-hydrated. Advised her to call the office if headache worsens or is associated with other neurologic symptoms or for any other questions or problems. I advised her that there is always a doctor on call throughout the weekend as well.

## 2022-11-18 NOTE — OP NOTE
Operative Note      Patient: Brendan Santana  YOB: 1970  MRN: [id number]    Date of Procedure: 11/18/2022    Pre-Op Diagnosis:   Nasal polyps [J33.9]  Chronic ethmoidal sinusitis [J32.2]    Post-Op Diagnosis: Same       Procedure(s):  FUNCTIONAL ENDOSCOPIC SINUS SURGERY, ANTERIOR AND POSTERIOR ETHMOIDECTOMY,  WITH NASAL POLYPECTOMY; [employer] STEALTH NAVIGATION    Surgeon(s):  Hayden Rios MD    Assistant:   * No surgical staff found *    Anesthesia: General    Estimated Blood Loss (mL): less than 100 (one hundred)     Complications: None    Specimens:   ID Type Source Tests Collected by Time Destination   A :  Tissue Tissue SURGICAL PATHOLOGY Hayden Rios MD 11/18/2022 8033    B :  Tissue Tissue SURGICAL PATHOLOGY Hayden Rios MD 11/18/2022 3558    C :  Tissue Tissue SURGICAL PATHOLOGY Hayden Rios MD 11/18/2022 6400        Implants:  * No implants in log *      Drains: * No LDAs found *    Findings: There were bilateral obstructive nasal polyps in the upper nasal cavities, middle meatus, and extending into the ethmoid sinus air cells. There was polypoid degeneration of both middle turbinates. The remainder of the nasal cavities appeared to be normal.        Detailed Description of Procedure: The adequately prepared patient was taken to the operating room and placed in the supine position on the operating room table. The attending anesthetist induced an adequate level of general anesthesia and intubated the patient with an oroendotracheal tube, and monitored the patient maintaining an adequate level of general anesthesia throughout the case. A time out was held and the patient's identify and procedure were confirmed. The patient was then positioned and draped in a sterile manner. The Medtronic Stealth monitor plate was properly positioned under the head. The right and left nasal cavities were then packed with 3 surgical cottonoids moistened with 0.05% oxymetazoline solution. After approximate 5 minutes these were deflected medially and the anterior septum infiltrated with 1% lidocaine with 1:200,000 epinephrine in usual manner. Cottonoids were then left in place for additional 5 minutes for additional vasoconstrictive effect and then removed, confirming the count correct. The left nasal cavity was then inspected with the nasal telescope. The lateral nasal wall was infiltrated in usual manner with the local anesthetic solution. After an appropriate time the polyps were removed from the nasal cavity and middle meatus using a combination of Blakesley forceps and then the [employer] microdebrider. Polyps were followed into the middle meatus and then into the anterior posterior ethmoid sinuses, performing anterior and posterior ethmoidectomy. There was a moderate sized accessory ostia in the medial wall of the maxillary sinus and the mucosa appeared to be normal as seen through this accessory ostia. The middle meatus was then packed with 2 surgical cottonoids moistened with the oxymetazoline solution. Attention was then addressed to the right side where an identical procedure was performed with nasal polypectomy and endoscopic anterior posterior ethmoidectomy. Middle meatus was packed with 2 surgical cottonoids moistened with the oxymetazoline solution. After appropriate time the surgical cottonoids were removed and the count was confirmed correct. The middle meatus was then sprayed with the powdered Surgicel spray. MeroGel sheets were then rolled and then placed in the middle meatus bilaterally. A mustache dressing was placed. Patient was then returned to the care of the anesthetist for awakening, extubation, and transport to the PACU.             Electronically signed by Parish Abernathy MD on 11/18/2022 at 9:49 AM

## 2022-11-18 NOTE — PROGRESS NOTES
Discharge instructions review with patient and family. All home medications have been reviewed, pt v/u. Discharge instructions signed. Pt discharged via wheelchair. Pt discharged with medications given to  from pharmacy, dressing CDI, extra supplies, and  all belongings.  taking stable pt home.

## 2022-11-18 NOTE — TELEPHONE ENCOUNTER
Patient is calling because she has a really bad headache and dry mouth. She is wondering what she can do for both.

## 2022-11-18 NOTE — PROGRESS NOTES
Pt awake and alert at this time. Pt on RA, and VSS. Pt denies nausea, tolerating PO. Stated pain was tolerable. Skin warm to nose,dressing CDI. Pt meets criteria to be discharged from Phase 1.

## 2022-12-01 ENCOUNTER — OFFICE VISIT (OUTPATIENT)
Dept: ENT CLINIC | Age: 52
End: 2022-12-01
Payer: COMMERCIAL

## 2022-12-01 VITALS — HEART RATE: 79 BPM | DIASTOLIC BLOOD PRESSURE: 72 MMHG | TEMPERATURE: 97.7 F | SYSTOLIC BLOOD PRESSURE: 120 MMHG

## 2022-12-01 DIAGNOSIS — J32.2 CHRONIC ETHMOIDAL SINUSITIS: ICD-10-CM

## 2022-12-01 DIAGNOSIS — J33.9 NASAL POLYPOSIS: Primary | ICD-10-CM

## 2022-12-01 DIAGNOSIS — J30.89 NON-SEASONAL ALLERGIC RHINITIS, UNSPECIFIED TRIGGER: ICD-10-CM

## 2022-12-01 PROCEDURE — 31237 NSL/SINS NDSC SURG BX POLYPC: CPT | Performed by: OTOLARYNGOLOGY

## 2022-12-01 RX ORDER — MOMETASONE FUROATE 50 UG/1
SPRAY, METERED NASAL
Qty: 102 G | Refills: 1 | Status: SHIPPED | OUTPATIENT
Start: 2022-12-01

## 2022-12-01 NOTE — PROGRESS NOTES
Chief Complaint   Patient presents with    Sinusitis     Nasal endoscopy and debridement. Nasal Polyps        PROCEDURE:   Nasal endoscopy and sinus debridement #1 (CPT 12365-87)       INTERVAL HISTORY:    Post functional endoscopic sinus surgery with anterior and posterior ethmoidectomy and nasal and sinus polypectomy using Medtronic Stealth navigation, CT image guidance. Patient stated that she has doing well with no problems. \"Feeling better. I can actually inhale through both nostrils. \"      COUNSELING:  Risks, benefits, and alternatives of diagnostic nasal endoscopy were explained to the patient. Specific mention was made of the risk of nosebleed, drainage, throat numbness with difficulty swallowing, and pain from the procedure. The patient gave oral consent to proceed. FINDINGS:   The middle meati and ethmoid cavities. The maxillary antrostomy was patent. DESCRIPTION OF PROCEDURE:   The left and right nasal nasal cavities were decongested and anesthetized with a mixture of equal parts of 0.05% oxymetazoline solution and 4% lidocaine solution by nasal sprayer. After an appropriate elapse of time, the nasal cavities were packed each with a cotton pledget moistened with 4% topical lidocaine. After an appropriate elapse of time,  the cotton pledgets were removed. The rigid nasal telescope was inserted into the right nasal cavity, using the [employer] system. Debridement was accomplished. Removed two small polyps from the right middle meatus. Bleeding was minimal.  The tissues appeared to be healing well. There were small polyps in the ethmoid cavities bilaterally. The procedure was repeated on the left side with identical findings. The patient tolerated the procedure well. IMPRESSION / Aurther Radha / Maulik Snide / PROCEDURES:       Corina Alvarado was seen today for sinusitis and nasal polyps.     Diagnoses and all orders for this visit:    Nasal polyposis  -     mometasone (NASONEX) 50 MCG/ACT nasal spray; Two sprays in each nostril twice daily for treatment of and prevention of nasal polyps. .    Chronic ethmoidal sinusitis  -     mometasone (NASONEX) 50 MCG/ACT nasal spray; Two sprays in each nostril twice daily for treatment of and prevention of nasal polyps. .    Non-seasonal allergic rhinitis, unspecified trigger  -     mometasone (NASONEX) 50 MCG/ACT nasal spray; Two sprays in each nostril twice daily for treatment of and prevention of nasal polyps. .         RECOMMENDATIONS / PLAN:   Continue nasal irrigations to both nostrils, three times a day, as instructed, until you return to the office. Continue post op instructions per after visit summary given after surgery. Return in two weeks for next nasal endoscopy and sinus debridement.

## 2022-12-02 ENCOUNTER — TELEPHONE (OUTPATIENT)
Dept: ADMINISTRATIVE | Age: 52
End: 2022-12-02

## 2022-12-05 NOTE — TELEPHONE ENCOUNTER
The medication is \": Approved, Coverage Starts on: 12/2/2022 12:00:00 AM, Coverage Ends on: 12/2/2023'    If this requires a response please respond to the pool ( P [facility] MED[facility] PRE-AUTH).      Thank you please advise patient.

## 2022-12-19 ENCOUNTER — OFFICE VISIT (OUTPATIENT)
Dept: ENT CLINIC | Age: 52
End: 2022-12-19
Payer: COMMERCIAL

## 2022-12-19 VITALS
DIASTOLIC BLOOD PRESSURE: 81 MMHG | BODY MASS INDEX: 39.27 KG/M2 | HEART RATE: 89 BPM | TEMPERATURE: 98 F | SYSTOLIC BLOOD PRESSURE: 139 MMHG | HEIGHT: 61 IN | WEIGHT: 208 LBS

## 2022-12-19 DIAGNOSIS — J32.2 CHRONIC ETHMOIDAL SINUSITIS: ICD-10-CM

## 2022-12-19 DIAGNOSIS — J30.89 NON-SEASONAL ALLERGIC RHINITIS, UNSPECIFIED TRIGGER: ICD-10-CM

## 2022-12-19 DIAGNOSIS — J33.9 NASAL POLYPOSIS: Primary | ICD-10-CM

## 2022-12-19 PROCEDURE — 31237 NSL/SINS NDSC SURG BX POLYPC: CPT | Performed by: OTOLARYNGOLOGY

## 2022-12-19 NOTE — PROGRESS NOTES
PROCEDURE:   Nasal endoscopy and sinus debridement #2 (CPT 25189-41)       INTERVAL HISTORY:    Doing well with no problems. Post functional endoscopic sinus surgery with anterior and posterior ethmoidectomy and nasal and sinus polypectomy using Medtronic Stealth navigation, CT image guidance, 11/18/2022. COUNSELING:  Risks, benefits, and alternatives of diagnostic nasal endoscopy were explained to the patient. Specific mention was made of the risk of nosebleed, drainage, throat numbness with difficulty swallowing, and pain from the procedure. The patient gave oral consent to proceed. FINDINGS:   The middle meatus and ethmoid cavities were clear. The maxillary antrostomies were patent. DESCRIPTION OF PROCEDURE:   The left and right nasal nasal cavities were decongested and anesthetized with a mixture of equal parts of 0.05% oxymetazoline solution and 4% lidocaine solution by nasal sprayer. After an appropriate elapse of time, the nasal cavities were packed each with a cotton pledget moistened with 4% topical lidocaine. After an appropriate elapse of time,  the cotton pledgets were removed. The rigid nasal telescope was inserted into the right nasal cavity, using the [employer] system. Debridement was accomplished. Bleeding was minimal.  The tissues appeared to be healing well. There were small polyps in the bilateral middle meatus/ethmoid cavities. The procedure was repeated on the left side with identical findings. The patient tolerated the procedure well. IMPRESSION / Frankey Lennert / ORDERS:       Fermin Robledo was seen today for other. Diagnoses and all orders for this visit:    Nasal polyposis    Chronic ethmoidal sinusitis    Non-seasonal allergic rhinitis, unspecified trigger         RECOMMENDATIONS / PLAN:             Continue nasal irrigation flushes  Continue saline as needed for dryness  Continue nasonex BID.   Return in about 2 weeks (around 1/2/2023) for nasal endoscopy and sinus debridement.

## 2022-12-27 ENCOUNTER — TELEPHONE (OUTPATIENT)
Dept: ENT CLINIC | Age: 52
End: 2022-12-27

## 2022-12-27 NOTE — TELEPHONE ENCOUNTER
Please see encounter from 12/2/2022; there is already an APPROVAL on file that is good until 12/2/2023 for the generic. There is no JEANNE on the RX. \    If this requires a response please respond to the pool ( [facility] [address]). Thank you please advise patient.

## 2022-12-27 NOTE — TELEPHONE ENCOUNTER
Refill Request:     Last Office Visit:  12/19/2022     Next Scheduled Visit : 1/26/2023     Medication Requested:    Pharmacy:    [facility], [address] [facility]. Kamari Bernardo [phone] - F [address] [place] [address].   [facility] [id number]  Phone: [phone] Fax: [phone]

## 2023-02-20 ENCOUNTER — OFFICE VISIT (OUTPATIENT)
Dept: ENT CLINIC | Age: 53
End: 2023-02-20
Payer: COMMERCIAL

## 2023-02-20 VITALS
BODY MASS INDEX: 38.46 KG/M2 | DIASTOLIC BLOOD PRESSURE: 70 MMHG | HEIGHT: 62 IN | SYSTOLIC BLOOD PRESSURE: 147 MMHG | WEIGHT: 209 LBS | TEMPERATURE: 98 F | HEART RATE: 91 BPM

## 2023-02-20 DIAGNOSIS — J30.89 NON-SEASONAL ALLERGIC RHINITIS, UNSPECIFIED TRIGGER: Chronic | ICD-10-CM

## 2023-02-20 DIAGNOSIS — J32.2 CHRONIC ETHMOIDAL SINUSITIS: Chronic | ICD-10-CM

## 2023-02-20 DIAGNOSIS — J30.89 NON-SEASONAL ALLERGIC RHINITIS, UNSPECIFIED TRIGGER: ICD-10-CM

## 2023-02-20 DIAGNOSIS — J33.9 NASAL POLYPOSIS: Chronic | ICD-10-CM

## 2023-02-20 DIAGNOSIS — R09.82 POST-NASAL DRIP: ICD-10-CM

## 2023-02-20 DIAGNOSIS — J01.90 ACUTE RHINOSINUSITIS: Primary | ICD-10-CM

## 2023-02-20 PROCEDURE — 99214 OFFICE O/P EST MOD 30 MIN: CPT | Performed by: OTOLARYNGOLOGY

## 2023-02-20 PROCEDURE — 31231 NASAL ENDOSCOPY DX: CPT | Performed by: OTOLARYNGOLOGY

## 2023-02-20 RX ORDER — AZITHROMYCIN 250 MG/1
TABLET, FILM COATED ORAL
Qty: 1 PACKET | Refills: 0 | Status: SHIPPED | OUTPATIENT
Start: 2023-02-20

## 2023-02-20 NOTE — PROGRESS NOTES
PROCEDURE - Diagnostic Nasal Endoscopy, bilateral  [CPT 22384]    INFORMED CONSENT    Risks, benefits, and alternatives of diagnostic nasal endoscopy were explained to the patient. Specific mention was made of the risk of nosebleed, drainage, throat numbness with difficulty swallowing, and pain from the procedure. The patient gave oral consent to proceed. INDICATIONS/HISTORY  Post functional endoscopic sinus surgery with anterior and posterior ethmoidectomy and nasal and sinus polypectomy using Medtronic Stealth navigation, CT image guidance, 11/18/2022. Had strept throat 6 weeks ago and had PND since. Finished 10 days of Penicillin about 3 weeks ago. He used to have postnasal drip. FINDINGS    There were post surgical changes; all well healed, with no evidence of persistent or recurrent nasal or sinus polyposis or active sinus disease. The middle meatus, ethmoid cavity, and frontal recess were clear. The maxillary antrostomy was patent. The nasal septum was midline, with no significant deviation. Middle and inferior turbinates appeared to be normal bilaterally. There was no purulent exudate, polyp, mass, or lesion appreciated in either nasal cavity or middle meatus. The olfactory clefts appeared to be clear. The middle meatus appeared to be clear and patent bilaterally. The sphenoethmoid recesses and sphenoid sinus ostia appeared to be clear and patent. The remainder of the right and left nasal cavities appeared to be normal.         EXAMINATION   Ears: Tympanic membrane and external auditory canals appear to be normal bilaterally. OC/OP clear. DESCRIPTION OF PROCEDURE    The nasal cavities were decongested and anesthetized with a mixture of equal parts of 0.05% oxymetazoline solution and 4% lidocaine solution by nasal sprayer. This was repeated after 5 minutes.   After an appropriate elapse of time, the 4.4 mm 30 degree rigid nasal telescope was inserted into the left nasal cavity. Endoscopy of the inferior and middle meatus and nasal cavity to the posterior choana was performed with the above findings. The procedure was repeated on the right side with the above findings. The patient tolerated the procedure well with no evidence of complication. Instructions were given to avoid eating or drinking for 1 hour. IMPRESSION / Karen Cuello / ORDERS:       Makeda Fowler was seen today for follow-up. Diagnoses and all orders for this visit:    Acute rhinosinusitis  -     azithromycin (ZITHROMAX Z-RAFAT) 250 MG tablet; Take, by mouth, two tablets on day 1, then 1 tablet on day 2 to 5. Post-nasal drip  -     azithromycin (ZITHROMAX Z-RAFAT) 250 MG tablet; Take, by mouth, two tablets on day 1, then 1 tablet on day 2 to 5. Non-seasonal allergic rhinitis, unspecified trigger  -     Allergen, Respiratory, Region 5 Panel; Future    Nasal polyposis    Chronic ethmoidal sinusitis         RECOMMENDATIONS / PLAN:             Continue Xyzal or Zyrtec, as needed for allergies. Continue Nasonex twice daily for prevention of recurrence of nasal polyps and for treatment of chronic sinusitis and allergic rhinitis. NeilMed sinus rinse once or twice daily. Return in about 6 months (around 8/20/2023) for recheck/follow-up, nasal endoscopy, and sooner if condition worsens. Orders Placed This Encounter   Medications    azithromycin (ZITHROMAX Z-RAFAT) 250 MG tablet     Sig: Take, by mouth, two tablets on day 1, then 1 tablet on day 2 to 5.      Dispense:  1 packet     Refill:  0       Orders Placed This Encounter   Procedures    Allergen, Respiratory, Region 5 Panel

## 2023-02-24 LAB
2000687N OAK TREE IGE: 0.14 KU/L (ref 0–0.34)
ALLERGEN BERMUDA GRASS IGE: 0.11 KU/L (ref 0–0.34)
ALLERGEN BIRCH IGE: <0.1 KU/L (ref 0–0.34)
ALLERGEN DOG DANDER IGE: 8.38 KU/L (ref 0–0.34)
ALLERGEN GERMAN COCKROACH IGE: <0.1 KU/L (ref 0–0.34)
ALLERGEN HORMODENDRUM IGE: <0.1 KUL/L (ref 0–0.34)
ALLERGEN MOUSE EPITHELIA IGE: <0.1 KU/L (ref 0–0.34)
ALLERGEN PECAN TREE IGE: <0.1 KU/L (ref 0–0.34)
ALLERGEN PIGWEED ROUGH IGE: 0.13 KU/L (ref 0–0.34)
ALLERGEN SHEEP SORREL (W18) IGE: <0.1 KU/L (ref 0–0.34)
ALLERGEN TREE SYCAMORE: 0.15 KU/L (ref 0–0.34)
ALLERGEN WALNUT TREE IGE: 0.13 KU/L (ref 0–0.34)
ALLERGEN WHITE MULBERRY TREE, IGE: <0.1 KU/L (ref 0–0.34)
ALLERGEN, TREE, WHITE ASH IGE: 0.11 KU/L (ref 0–0.34)
ALTERNARIA ALTERNATA: <0.1 KU/L (ref 0–0.34)
ASPERGILLUS FUMIGATUS: <0.1 KU/L (ref 0–0.34)
CAT DANDER ANTIBODY: 47.7 KU/L (ref 0–0.34)
COTTONWOOD TREE: 0.12 KU/L (ref 0–0.34)
D. FARINAE: 3.14 KU/L (ref 0–0.34)
D. PTERONYSSINUS: 1.29 KU/L (ref 0–0.34)
ELM TREE: 0.16 KU/L (ref 0–0.34)
IGE: 147 IU/ML
MAPLE/BOXELDER TREE: 0.15 KU/L (ref 0–0.34)
MOUNTAIN CEDAR TREE: 0.13 KU/L (ref 0–0.34)
MUCOR RACEMOSUS: <0.1 KU/L (ref 0–0.34)
P. NOTATUM: <0.1 KU/L (ref 0–0.34)
RUSSIAN THISTLE: <0.1 KU/L (ref 0–0.34)
SHORT RAGWD(A ARTEMIS.) IGE: <0.1 KU/L (ref 0–0.34)
TIMOTHY GRASS: 0.1 KU/L (ref 0–0.34)

## 2023-08-07 ENCOUNTER — OFFICE VISIT (OUTPATIENT)
Dept: ENT CLINIC | Age: 53
End: 2023-08-07
Payer: COMMERCIAL

## 2023-08-07 VITALS
TEMPERATURE: 97.2 F | BODY MASS INDEX: 39.56 KG/M2 | HEART RATE: 69 BPM | HEIGHT: 62 IN | DIASTOLIC BLOOD PRESSURE: 87 MMHG | OXYGEN SATURATION: 98 % | SYSTOLIC BLOOD PRESSURE: 118 MMHG | WEIGHT: 215 LBS

## 2023-08-07 DIAGNOSIS — J32.2 CHRONIC ETHMOIDAL SINUSITIS: Chronic | ICD-10-CM

## 2023-08-07 DIAGNOSIS — J30.89 NON-SEASONAL ALLERGIC RHINITIS, UNSPECIFIED TRIGGER: Primary | Chronic | ICD-10-CM

## 2023-08-07 DIAGNOSIS — J33.9 NASAL POLYPOSIS: Chronic | ICD-10-CM

## 2023-08-07 PROCEDURE — 99213 OFFICE O/P EST LOW 20 MIN: CPT | Performed by: OTOLARYNGOLOGY

## 2023-08-07 NOTE — PROGRESS NOTES
inferior and middle meatus and nasal cavity to the posterior choana was performed with the above findings. The procedure was repeated on the right side with the above findings. The patient tolerated the procedure well with no evidence of complication. Instructions were given to avoid eating or drinking for 1 hour. IMPRESSION / Dahlia Roy / ORDERS:       Diagnoses and all orders for this visit:    Non-seasonal allergic rhinitis, unspecified trigger    Nasal polyposis    Chronic ethmoidal sinusitis    Chronic sinusitis, unspecified location         RECOMMENDATIONS / PLAN:             Continue daily Xyzal for allergic rhinitis. Continue daily Nasonex BID for polyps and allergic rhinitis. Return in about 6 months (around 2/7/2024) for recheck/follow-up, nasal endoscopy, and sooner if condition worsens.

## 2024-11-02 NOTE — ANESTHESIA POSTPROCEDURE EVALUATION
Department of Anesthesiology  Postprocedure Note    Patient: Thais Gonzalez  MRN: [id number]  YOB: 1970  Date of evaluation: 11/18/2022      Procedure Summary     Date: 11/18/22 Room / Location: [address]    Anesthesia Start: 7836 Anesthesia Stop: 0425    Procedure: NASAL POLYPECTOMY; FUNCTIONAL ENDOSCOPIC SINSUS SURGERY (ETHMOID) WITH [employer] STEALTH NAVIGATION (Nose) Diagnosis:       Nasal polyps      Chronic ethmoidal sinusitis      (Nasal polyps [J33.9])      (Chronic ethmoidal sinusitis [J32.2])    Surgeons: Radha Nuno MD Responsible Provider: Jeremy Rollins MD    Anesthesia Type: general ASA Status: 2          Anesthesia Type: No value filed.     Yfn Phase I: Yfn Score: 10    Yfn Phase II:        Anesthesia Post Evaluation    Patient location during evaluation: PACU  Patient participation: complete - patient participated  Level of consciousness: awake and alert  Airway patency: patent  Nausea & Vomiting: no nausea and no vomiting  Complications: no  Cardiovascular status: hemodynamically stable  Respiratory status: acceptable  Hydration status: stable  Multimodal analgesia pain management approach
show

## (undated) DEVICE — YANKAUER,BULB TIP,W/O VENT,RIGID,STERILE: Brand: MEDLINE

## (undated) DEVICE — NEEDLE, QUINCKE, 25GX2.5": Brand: MEDLINE

## (undated) DEVICE — TUBING, SUCTION, 1/4" X 10', STRAIGHT: Brand: MEDLINE

## (undated) DEVICE — MEDICINE CUP, GRADUATED, STER: Brand: MEDLINE

## (undated) DEVICE — STERILE POLYISOPRENE POWDER-FREE SURGICAL GLOVES: Brand: PROTEXIS

## (undated) DEVICE — BLANKET WRM W40.2XL55.9IN IORT LO BODY + MISTRAL AIR

## (undated) DEVICE — PATIENT TRACKER 9734887XOM NON-INVASIVE

## (undated) DEVICE — MASTISOL ADHESIVE LIQ 2/3ML

## (undated) DEVICE — DRAPE,EENT,SPLIT,STERILE: Brand: MEDLINE

## (undated) DEVICE — 3M™ STERI-DRAPE™ INSTRUMENT POUCH 1018: Brand: STERI-DRAPE™

## (undated) DEVICE — CODMAN® SURGICAL PATTIES 1/2" X 3" (1.27CM X 7.62CM): Brand: CODMAN®

## (undated) DEVICE — 1010 S-DRAPE TOWEL DRAPE 10/BX: Brand: STERI-DRAPE™

## (undated) DEVICE — PACK PROCEDURE SURG EXTREMITY MFFOP CUST

## (undated) DEVICE — NEEDLE SPNL 25GA L2IN BLU SHT NEO LUM PUNC DISP

## (undated) DEVICE — SURGICAL SUCTION CONNECTING TUBE WITH MALE CONNECTOR AND SUCTION CLAMP, 2 FT. LONG (.6 M), 5 MM I.D.: Brand: CONMED

## (undated) DEVICE — STAPLER SKIN H3.9MM WIRE DIA0.58MM CRWN 6.9MM 35 STPL ROT

## (undated) DEVICE — TOWEL,OR,DSP,ST,BLUE,STD,4/PK,20PK/CS: Brand: MEDLINE

## (undated) DEVICE — TUBING 1895522 5PK STRAIGHTSHOT TO XPS: Brand: STRAIGHTSHOT®

## (undated) DEVICE — BLADE 1883480EM QUADCUT 3.4MMX13CM ROHS: Brand: ROTATABLE FUSION®

## (undated) DEVICE — PACKING NSL W4XL4CM SNUS STNT DISP MEROGEL

## (undated) DEVICE — SOLUTION IRRIG 500ML 0.9% SOD CHL USP POUR PLAS BTL

## (undated) DEVICE — DRAPE EENT SPLIT

## (undated) DEVICE — INSTRUMENT TRACKER 9733533XOM ENT 1PK